# Patient Record
(demographics unavailable — no encounter records)

---

## 2024-10-09 NOTE — HISTORY OF PRESENT ILLNESS
[No falls in past year] : Patient reported no falls in the past year [0] : 2) Feeling down, depressed, or hopeless: Not at all (0) [PHQ-2 Negative - No further assessment needed] : PHQ-2 Negative - No further assessment needed [FreeTextEntry1] : PCP Dr. Rosenberg  Cardiology Dr. Rausch Uro Dr. WU  Mr. GIGI DONG is a 98 year old man with pmhx of  BPH, insomnia, htn,  a fib s/p cardioversion, anemia and fatigue, hx ot uti, LE edema presents to the office for a follow up visit.  He endorses minor cold for the past day. He denied fever, chills, bodyaches, shortness of breath. He is concerned about blood pressure being elevated at home. He brings a log. He denied any headaches or vision changes. He is taking losartan with lunch. Noticed bp elevated in the morning. Advised BID dosing of losartan 25mg , which he is amenable for.  [[id number]] : 0

## 2024-10-09 NOTE — REVIEW OF SYSTEMS
[Lower Ext Edema] : lower extremity edema [Fever] : no fever [Chills] : no chills [Heart Rate Is Slow] : the heart rate was not slow [Heart Rate Is Fast] : the heart rate was not fast [Chest Pain] : no chest pain [Palpitations] : no palpitations [Shortness Of Breath] : no shortness of breath [Wheezing] : no wheezing [Cough] : no cough [Abdominal Pain] : no abdominal pain [Vomiting] : no vomiting [Constipation] : no constipation [Diarrhea] : no diarrhea [Dysuria] : no dysuria [Confused] : no confusion [Dizziness] : no dizziness [Anxiety] : no anxiety [Depression] : no depression

## 2024-10-09 NOTE — PHYSICAL EXAM
[Alert] : alert [EOMI] : extraocular movements were intact [Normal Appearance] : the appearance of the neck was normal [Supple] : the neck was supple [No Respiratory Distress] : no respiratory distress [No Acc Muscle Use] : no accessory muscle use [Auscultation Breath Sounds / Voice Sounds] : lungs were clear to auscultation bilaterally [Normal S1, S2] : normal S1 and S2 [Heart Rate And Rhythm] : heart rate was normal and rhythm regular [___ +] : bilateral [unfilled]+ pretibial pitting edema [Abdomen Tenderness] : non-tender [Abdomen Soft] : soft [No Spinal Tenderness] : no spinal tenderness [Normal Color / Pigmentation] : normal skin color and pigmentation [Oriented To Time, Place, And Person] : oriented to person, place, and time [Normal Affect] : the affect was normal [Normal Mood] : the mood was normal

## 2024-10-19 NOTE — REVIEW OF SYSTEMS
[Lower Ext Edema] : lower extremity edema [Shortness Of Breath] : shortness of breath [Fever] : no fever [Chills] : no chills [Heart Rate Is Slow] : the heart rate was not slow [Heart Rate Is Fast] : the heart rate was not fast [Chest Pain] : no chest pain [Palpitations] : no palpitations [Wheezing] : no wheezing [Cough] : no cough [Abdominal Pain] : no abdominal pain [Vomiting] : no vomiting [Constipation] : no constipation [Diarrhea] : no diarrhea [Dysuria] : no dysuria [Confused] : no confusion [Dizziness] : no dizziness [Anxiety] : no anxiety [Depression] : no depression [FreeTextEntry6] : sob on exertion - chronic

## 2024-10-19 NOTE — PHYSICAL EXAM
[Alert] : alert [EOMI] : extraocular movements were intact [Normal Appearance] : the appearance of the neck was normal [Supple] : the neck was supple [No Respiratory Distress] : no respiratory distress [No Acc Muscle Use] : no accessory muscle use [Auscultation Breath Sounds / Voice Sounds] : lungs were clear to auscultation bilaterally [Normal S1, S2] : normal S1 and S2 [Heart Rate And Rhythm] : heart rate was normal and rhythm regular [___ +] : bilateral [unfilled]+ pretibial pitting edema [Abdomen Tenderness] : non-tender [Abdomen Soft] : soft [No Spinal Tenderness] : no spinal tenderness [Normal Color / Pigmentation] : normal skin color and pigmentation [Oriented To Time, Place, And Person] : oriented to person, place, and time [Normal Affect] : the affect was normal [Normal Mood] : the mood was normal [de-identified] : pitting edema bilateral lower extremities

## 2024-10-19 NOTE — GOALS
[Patient] : patient [Healthcare proxy document is in chart] : Healthcare proxy document is in chart [Full Code] : Full Code [Time Spent: ___ minutes] : I, personally, spent [unfilled] minutes on advance care planning services with the patient.  This time is separate and distinct from any other care management services provided on this date [FreeTextEntry1] : Wilmar Hartmann [FreeTextEntry2] : son [FreeTextEntry3] : [phone] [FreeTextEntry6] : Andrea Lovett [FreeTextEntry5] : son [FreeTextEntry4] : [phone] [FreeTextEntry7] : Spoke about his urination issues, recent sepsis, kidney mass, being weaker and having less reserve.  Educated that he is currently full treatment and full code and if he doesnt want that then we need to write orders.  Explained what DNR and DNI is and means... questions answered. Pt is full code and will consider in future. [FreeTextEntry8] : Wilmar Lovett [FreeTextEntry9] : son [de-identified] : [phone]

## 2024-10-19 NOTE — HISTORY OF PRESENT ILLNESS
[No falls in past year] : Patient reported no falls in the past year [0] : 2) Feeling down, depressed, or hopeless: Not at all (0) [PHQ-2 Negative - No further assessment needed] : PHQ-2 Negative - No further assessment needed [FreeTextEntry1] : PCP Dr. Rosenberg  Cardiology Dr. Rausch Uro Dr. [facility]  Mr. GIGI DONG is a 98 year old man with pmhx of  BPH, insomnia, htn,  a fib s/p cardioversion, anemia and fatigue, hx ot uti, LE edema presents to the office for a follow up visit.    Received calls over the past weekend that patient's home blood pressure measurements have been elevated. Patient's dose of losartan was increased from 25 BID to 50 BID, and still remained uncontrolled. Patient was then additionally started on amlodipine 5 mg oral QD.  Today the patient's blood pressure is; 151/62. Repeat with home blood pressure monitor: 148/61.  He denies chest pain, sob, diaphoresis, nausea and vomiting, headaches, and abd pain.  We also discussed patient renal mass; chronic concern. No follow ups needed at this time. The patient has seen radonc and urology in the past as well. He follows with urology also for urinary retention and has a richard bag. [[id number]] : 0

## 2024-10-19 NOTE — GOALS
[Patient] : patient [Healthcare proxy document is in chart] : Healthcare proxy document is in chart [Full Code] : Full Code [Time Spent: ___ minutes] : I, personally, spent [unfilled] minutes on advance care planning services with the patient.  This time is separate and distinct from any other care management services provided on this date [FreeTextEntry1] : Wilmar Hartmann [FreeTextEntry3] : [phone] [FreeTextEntry2] : son [FreeTextEntry5] : son [FreeTextEntry6] : Andrea Lovett [FreeTextEntry4] : [phone] [FreeTextEntry7] : Spoke about his urination issues, recent sepsis, kidney mass, being weaker and having less reserve.  Educated that he is currently full treatment and full code and if he doesnt want that then we need to write orders.  Explained what DNR and DNI is and means... questions answered. Pt is full code and will consider in future. [FreeTextEntry8] : Wilmar Lovett [FreeTextEntry9] : son [de-identified] : [phone]

## 2024-10-19 NOTE — PHYSICAL EXAM
[Alert] : alert [EOMI] : extraocular movements were intact [Normal Appearance] : the appearance of the neck was normal [Supple] : the neck was supple [No Respiratory Distress] : no respiratory distress [No Acc Muscle Use] : no accessory muscle use [Auscultation Breath Sounds / Voice Sounds] : lungs were clear to auscultation bilaterally [Normal S1, S2] : normal S1 and S2 [Heart Rate And Rhythm] : heart rate was normal and rhythm regular [___ +] : bilateral [unfilled]+ pretibial pitting edema [Abdomen Tenderness] : non-tender [Abdomen Soft] : soft [No Spinal Tenderness] : no spinal tenderness [Normal Color / Pigmentation] : normal skin color and pigmentation [Oriented To Time, Place, And Person] : oriented to person, place, and time [Normal Affect] : the affect was normal [Normal Mood] : the mood was normal [de-identified] : pitting edema bilateral lower extremities

## 2024-10-19 NOTE — END OF VISIT
[Time Spent: ___ minutes] : I have spent [unfilled] minutes of time on the encounter which excludes teaching and separately reported services. [] : Fellow [FreeTextEntry3] : pt stable, but becoming more complex and pt starting to appreciate that he is in a new state. Pt will not be going to [place] this winter for the first time.  ACP discussed. Pt full code... educated on DNR and DNI, but pt to think about it.

## 2024-10-19 NOTE — HISTORY OF PRESENT ILLNESS
[No falls in past year] : Patient reported no falls in the past year [0] : 2) Feeling down, depressed, or hopeless: Not at all (0) [PHQ-2 Negative - No further assessment needed] : PHQ-2 Negative - No further assessment needed [FreeTextEntry1] : PCP Dr. Rosenberg  Cardiology Dr. Rausch Uro Dr. [facility]U  Mr. GIGI DONG is a 98 year old man with pmhx of  BPH, insomnia, htn,  a fib s/p cardioversion, anemia and fatigue, hx ot uti, LE edema presents to the office for a follow up visit.    Received calls over the past weekend that patient's home blood pressure measurements have been elevated. Patient's dose of losartan was increased from 25 BID to 50 BID, and still remained uncontrolled. Patient was then additionally started on amlodipine 5 mg oral QD.  Today the patient's blood pressure is; 151/62. Repeat with home blood pressure monitor: 148/61.  He denies chest pain, sob, diaphoresis, nausea and vomiting, headaches, and abd pain.  We also discussed patient renal mass; chronic concern. No follow ups needed at this time. The patient has seen radonc and urology in the past as well. He follows with urology also for urinary retention and has a richard bag. [[id number]] : 0

## 2024-10-19 NOTE — PHYSICAL EXAM
[Alert] : alert [EOMI] : extraocular movements were intact [Normal Appearance] : the appearance of the neck was normal [Supple] : the neck was supple [No Respiratory Distress] : no respiratory distress [No Acc Muscle Use] : no accessory muscle use [Auscultation Breath Sounds / Voice Sounds] : lungs were clear to auscultation bilaterally [Normal S1, S2] : normal S1 and S2 [Heart Rate And Rhythm] : heart rate was normal and rhythm regular [___ +] : bilateral [unfilled]+ pretibial pitting edema [Abdomen Tenderness] : non-tender [Abdomen Soft] : soft [No Spinal Tenderness] : no spinal tenderness [Normal Color / Pigmentation] : normal skin color and pigmentation [Oriented To Time, Place, And Person] : oriented to person, place, and time [Normal Affect] : the affect was normal [Normal Mood] : the mood was normal [de-identified] : pitting edema bilateral lower extremities

## 2024-10-19 NOTE — HISTORY OF PRESENT ILLNESS
[No falls in past year] : Patient reported no falls in the past year [0] : 2) Feeling down, depressed, or hopeless: Not at all (0) [PHQ-2 Negative - No further assessment needed] : PHQ-2 Negative - No further assessment needed [FreeTextEntry1] : PCP Dr. Rosenberg  Cardiology Dr. Rausch Uro Dr. [facility]U  Mr. GIGI DONG is a 98 year old man with pmhx of  BPH, insomnia, htn,  a fib s/p cardioversion, anemia and fatigue, hx ot uti, LE edema presents to the office for a follow up visit.    Received calls over the past weekend that patient's home blood pressure measurements have been elevated. Patient's dose of losartan was increased from 25 BID to 50 BID, and still remained uncontrolled. Patient was then additionally started on amlodipine 5 mg oral QD.  Today the patient's blood pressure is; 151/62. Repeat with home blood pressure monitor: 148/61.  He denies chest pain, sob, diaphoresis, nausea and vomiting, headaches, and abd pain.  We also discussed patient renal mass; chronic concern. No follow ups needed at this time. The patient has seen radonc and urology in the past as well. He follows with urology also for urinary retention and has a richard bag. [[id number]] : 0

## 2024-10-19 NOTE — GOALS
[Patient] : patient [Healthcare proxy document is in chart] : Healthcare proxy document is in chart [Full Code] : Full Code [Time Spent: ___ minutes] : I, personally, spent [unfilled] minutes on advance care planning services with the patient.  This time is separate and distinct from any other care management services provided on this date [FreeTextEntry1] : Wilmar Hartmann [FreeTextEntry3] : [phone] [FreeTextEntry2] : son [FreeTextEntry6] : Andrea Lovett [FreeTextEntry5] : son [FreeTextEntry4] : [phone] [FreeTextEntry7] : Spoke about his urination issues, recent sepsis, kidney mass, being weaker and having less reserve.  Educated that he is currently full treatment and full code and if he doesnt want that then we need to write orders.  Explained what DNR and DNI is and means... questions answered. Pt is full code and will consider in future. [FreeTextEntry8] : Wilmar Lovett [FreeTextEntry9] : son [de-identified] : [phone]

## 2024-11-25 NOTE — DISCUSSION/SUMMARY
[FreeTextEntry1] : Mr. Allen is an 98 year-old gentleman with moderate aortic stenosis who underwent PCI of his LAD on 12/28/2016 for angina, after an abnormal stress test.  Newly diagnosed renal mass. Eval at [facility] underway. Planning MRI surveillance. ECG shows normal sinus rhythm with Normal axis and normal intervals. PAF: In SR now. Continue Eliquis 2.5 bid For stroke risk reduction. Aortic stenosis: Echocardiography done in the hospital showed normal left ventricular systolic function with moderate to severe AS. does not appear to be in the severe range on echocardiography or on examination today. Discussed the possibility of TAVR if dyspena is worse. Hypertension: Well controlled. On no medical therapy. Coronary artery disease: No angina. He should continue high-dose statin And baby aspirin. Anemia is significant, but stable over the past month. Plan to see Dr. Rosenberg for further workup. PRN viagra at the lowest dose possible.  Follow-up in March.  [EKG obtained to assist in diagnosis and management of assessed problem(s)] : EKG obtained to assist in diagnosis and management of assessed problem(s)

## 2024-11-25 NOTE — PHYSICAL EXAM
[General Appearance - Well Developed] : well developed [General Appearance - Well Nourished] : well nourished [General Appearance - In No Acute Distress] : no acute distress [Normal Conjunctiva] : the conjunctiva exhibited no abnormalities [No Oral Pallor] : no oral pallor [Normal Jugular Venous V Waves Present] : normal jugular venous V waves present [Respiration, Rhythm And Depth] : normal respiratory rhythm and effort [Auscultation Breath Sounds / Voice Sounds] : lungs were clear to auscultation bilaterally [Heart Sounds] : normal S1 and S2 [Arterial Pulses Normal] : the arterial pulses were normal [Regular] : the rhythm was regular [FreeTextEntry1] : there is a 1/6 systolic ejection murmur at the right upper sternal border, radiating to the carotids. His left leg is swollen to the knee with tenderness in the posterior fossa. [Bowel Sounds] : normal bowel sounds [Abdomen Soft] : soft [Abdomen Tenderness] : non-tender [Abnormal Walk] : normal gait [Cyanosis, Localized] : no localized cyanosis [Skin Turgor] : normal skin turgor [Oriented To Time, Place, And Person] : oriented to person, place, and time [Impaired Insight] : insight and judgment were intact [Affect] : the affect was normal
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
## 2024-12-26 NOTE — REVIEW OF SYSTEMS
[SOB on Exertion] : shortness of breath during exertion [Itching] : itching [Dry Skin] : dry skin [Negative] : Heme/Lymph
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
## 2024-12-27 NOTE — PHYSICAL EXAM
[Alert] : alert [No Acute Distress] : in no acute distress [Normal Outer Ear/Nose] : the ears and nose were normal in appearance [Normal Appearance] : the appearance of the neck was normal [No Respiratory Distress] : no respiratory distress [No Acc Muscle Use] : no accessory muscle use [Bowel Sounds] : normal bowel sounds [Abdomen Tenderness] : non-tender [Abdomen Soft] : soft [Normal Turgor] : normal skin turgor [No Focal Deficits] : no focal deficits [Normal Affect] : the affect was normal [Normal Mood] : the mood was normal [de-identified] : right lower leg mildly erythematous, no edema, skin very dry bilaterally
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
## 2024-12-27 NOTE — PHYSICAL EXAM
[Alert] : alert [No Acute Distress] : in no acute distress [Normal Outer Ear/Nose] : the ears and nose were normal in appearance [Normal Appearance] : the appearance of the neck was normal [No Respiratory Distress] : no respiratory distress [No Acc Muscle Use] : no accessory muscle use [Bowel Sounds] : normal bowel sounds [Abdomen Tenderness] : non-tender [Abdomen Soft] : soft [Normal Turgor] : normal skin turgor [No Focal Deficits] : no focal deficits [Normal Affect] : the affect was normal [Normal Mood] : the mood was normal [de-identified] : right lower leg mildly erythematous, no edema, skin very dry bilaterally

## 2025-02-28 NOTE — END OF VISIT
[] : Fellow [FreeTextEntry3] : Pt seen with fellow. Pt still with edema, no sob, improving. Mir in place. No changes.

## 2025-02-28 NOTE — REVIEW OF SYSTEMS
[Lower Ext Edema] : lower extremity edema [As Noted in HPI] : as noted in HPI [SOB on Exertion] : shortness of breath during exertion [Negative] : Heme/Lymph [Wheezing] : no wheezing [Cough] : no cough

## 2025-02-28 NOTE — PHYSICAL EXAM
[Alert] : alert [No Acute Distress] : in no acute distress [Sclera] : the sclera and conjunctiva were normal [EOMI] : extraocular movements were intact [Normal Outer Ear/Nose] : the ears and nose were normal in appearance [Normal Appearance] : the appearance of the neck was normal [No Respiratory Distress] : no respiratory distress [Auscultation Breath Sounds / Voice Sounds] : lungs were clear to auscultation bilaterally [Normal S1, S2] : normal S1 and S2 [Heart Rate And Rhythm] : heart rate was normal and rhythm regular [Pedal Pulses Normal] : the pedal pulses are present [Bowel Sounds] : normal bowel sounds [Abdomen Tenderness] : non-tender [Abdomen Soft] : soft [Normal Color / Pigmentation] : normal skin color and pigmentation [Normal Turgor] : normal skin turgor [No Focal Deficits] : no focal deficits [Normal Affect] : the affect was normal [Normal Mood] : the mood was normal [de-identified] : 1+ pitting edema bilat to mid shin [de-identified] : +richard with leg bag draining clear yellow urine

## 2025-02-28 NOTE — PHYSICAL EXAM
[Alert] : alert [No Acute Distress] : in no acute distress [Sclera] : the sclera and conjunctiva were normal [EOMI] : extraocular movements were intact [Normal Outer Ear/Nose] : the ears and nose were normal in appearance [Normal Appearance] : the appearance of the neck was normal [No Respiratory Distress] : no respiratory distress [Auscultation Breath Sounds / Voice Sounds] : lungs were clear to auscultation bilaterally [Normal S1, S2] : normal S1 and S2 [Heart Rate And Rhythm] : heart rate was normal and rhythm regular [Pedal Pulses Normal] : the pedal pulses are present [Bowel Sounds] : normal bowel sounds [Abdomen Tenderness] : non-tender [Abdomen Soft] : soft [Normal Color / Pigmentation] : normal skin color and pigmentation [Normal Turgor] : normal skin turgor [No Focal Deficits] : no focal deficits [Normal Affect] : the affect was normal [Normal Mood] : the mood was normal [de-identified] : 1+ pitting edema bilat to mid shin [de-identified] : +richard with leg bag draining clear yellow urine

## 2025-02-28 NOTE — HISTORY OF PRESENT ILLNESS
[No falls in past year] : Patient reported no falls in the past year [Completely Independent] : Completely independent. [0] : 2) Feeling down, depressed, or hopeless: Not at all (0) [PHQ-2 Negative - No further assessment needed] : PHQ-2 Negative - No further assessment needed [FreeTextEntry1] : Mr Allen is a 99-year-old M WW2  with history of AS, CAD with stenting, Aflutter (on eliquis), BPH c/b urinary retention with indwelling richard, RCC s/p partial nephrectomy, orthostatic hypotension presents for follow up for leg swelling secondary to mod-severe AS.  Was started on 20 mg furosemide daily approx 1-2 weeks ago, states minimal improvement in leg swelling and dyspnea on exertion. Unable to quantify if he has increased urination given he has a richard.   Otherwise shares concerns about his anemia, recently seen by Heme Onc and labs showed iron deficiency anemia. Reports that during a richard catheter change he had a significant amount of bleeding. Was told by hematology they will recheck labs in about a month given recent acute bleed to see if they should start iron therapy. Also was seen by ENT for nasal polyps but ENT hesitant to proceed with resection given pt is on ASA and eliquis.  Regarding BPH with chronic richard, pt states he changes the richard bag once a day and the catheter once monthly, was last change 3 weeks ago and is due next week.   States insomnia is well controlled with trazodone for many years.

## 2025-04-03 NOTE — PHYSICAL EXAM
[General Appearance - Well Developed] : well developed [General Appearance - Well Nourished] : well nourished [General Appearance - In No Acute Distress] : no acute distress [Normal Conjunctiva] : the conjunctiva exhibited no abnormalities [No Oral Pallor] : no oral pallor [Normal Jugular Venous V Waves Present] : normal jugular venous V waves present [Respiration, Rhythm And Depth] : normal respiratory rhythm and effort [Auscultation Breath Sounds / Voice Sounds] : lungs were clear to auscultation bilaterally [Heart Sounds] : normal S1 and S2 [Arterial Pulses Normal] : the arterial pulses were normal [Regular] : the rhythm was regular [Bowel Sounds] : normal bowel sounds [Abdomen Soft] : soft [Abdomen Tenderness] : non-tender [Abnormal Walk] : normal gait [Cyanosis, Localized] : no localized cyanosis [Skin Turgor] : normal skin turgor [Oriented To Time, Place, And Person] : oriented to person, place, and time [Impaired Insight] : insight and judgment were intact [Affect] : the affect was normal [FreeTextEntry1] : there is a 1/6 systolic ejection murmur at the right upper sternal border, radiating to the carotids. His left leg is swollen to the knee with tenderness in the posterior fossa.

## 2025-04-03 NOTE — DISCUSSION/SUMMARY
[FreeTextEntry1] : Mr. Allen is an 99 year-old gentleman with moderate to sever aortic stenosis who underwent PCI of his LAD on 12/28/2016 for angina.  Now with syncope and head trauma. Syncope: Consist possible etiologies include orthostatic syncope or arrhythmic syncope.  I am also concerned that his aortic stenosis has progressed which would make him unusually sensitive to orthostatic changes. Towards that end I have suggested that he reduce his Lasix dose to thrice weekly, Monday Wednesday Friday and have suggested that he be evaluated for potential TAVR procedure.  His current rhythm is sinus bradycardia and I have placed a cardiac monitor on him for 1 week to exclude any underlying electrical abnormalities that would explain his syncope.   PAF: In SR now. 1 episode of unexplained syncope.  We discussed the possible risks of rebleeding and given the fact that he is no longer orthostatic today on exam I have suggested restarting Eliquis 2.5 mg twice daily for stroke risk reduction.  Aortic stenosis: TAVR eval. Hypertension: Reduced Lasix dose, will consider reducing his amlodipine or losartan if blood pressure remains low. Coronary artery disease: No angina. He should continue high-dose statin We have discontinued his aspirin therapy As a poses too high risk for him at this time. He will continue Eliquis 2.5 twice daily. Written instructions were given to him. Follow-up with me in 1 month.  [EKG obtained to assist in diagnosis and management of assessed problem(s)] : EKG obtained to assist in diagnosis and management of assessed problem(s)
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
## 2025-04-08 NOTE — RESULTS/DATA
[FreeTextEntry1] : EXAM: [id number] - CT BRAIN  - ORDERED BY: VIVIAN TRONCOSO   PROCEDURE DATE:  03/28/2025    INTERPRETATION:  .  CLINICAL INFORMATION: Traumatic brain injury with subarachnoid hemorrhage. Follow-up.  TECHNIQUE: Multiple axial CT images of the head were obtained without contrast. Sagittal and coronal reconstructed images were acquired from the source data.  COMPARISON: Prior CT examination of the head from 3/13/2025.  FINDINGS: Previously seen right frontal acute subarachnoid hemorrhage has resolved. No new areas of acute intracranial hemorrhage are seen.  There is no acute intracranial hemorrhage, mass effect, shift of the midline structures, herniation, extra-axial fluid collection, or hydrocephalus.  There is mild diffuse cerebral volume loss with prominence of the sulci, fissures, and cisternal spaces which is normal for the patient's age. There is mild deep and periventricular white matter hypoattenuation statistically compatible with microvascular changes given calcific atherosclerotic disease of the intracranial arteries.  Polypoidal soft tissue within the bilateral sinonasal cavities appear unchanged. Bilateral mastoid effusions appear unchanged. The calvarium is intact. The imaged orbits are unremarkable. Right frontal scalp soft tissue swelling extending to the periorbital region has decreased.  IMPRESSION: Interval resolution of right frontal subarachnoid hemorrhage in comparison with 3/13/2025.  No new areas of acute intracranial hemorrhage.  Interval decrease in the amount of right frontal scalp soft tissue swelling extending to the periorbital region.

## 2025-04-08 NOTE — RESULTS/DATA
[FreeTextEntry1] : EXAM: [id number] - CT BRAIN  - ORDERED BY: VIVIAN TRONCOSO   PROCEDURE DATE:  03/28/2025    INTERPRETATION:  .  CLINICAL INFORMATION: Traumatic brain injury with subarachnoid hemorrhage. Follow-up.  TECHNIQUE: Multiple axial CT images of the head were obtained without contrast. Sagittal and coronal reconstructed images were acquired from the source data.  COMPARISON: Prior CT examination of the head from 3/13/2025.  FINDINGS: Previously seen right frontal acute subarachnoid hemorrhage has resolved. No new areas of acute intracranial hemorrhage are seen.  There is no acute intracranial hemorrhage, mass effect, shift of the midline structures, herniation, extra-axial fluid collection, or hydrocephalus.  There is mild diffuse cerebral volume loss with prominence of the sulci, fissures, and cisternal spaces which is normal for the patient's age. There is mild deep and periventricular white matter hypoattenuation statistically compatible with microvascular changes given calcific atherosclerotic disease of the intracranial arteries.  Polypoidal soft tissue within the bilateral sinonasal cavities appear unchanged. Bilateral mastoid effusions appear unchanged. The calvarium is intact. The imaged orbits are unremarkable. Right frontal scalp soft tissue swelling extending to the periorbital region has decreased.  IMPRESSION: Interval resolution of right frontal subarachnoid hemorrhage in comparison with 3/13/2025.  No new areas of acute intracranial hemorrhage.  Interval decrease in the amount of right frontal scalp soft tissue swelling extending to the periorbital region.

## 2025-04-08 NOTE — HISTORY OF PRESENT ILLNESS
[FreeTextEntry1] : Bony Allen is a 99 is here for follow up after a fall. Pt stated that he was doing well, but he was shopping and while back to car responding to a call, while turning he fell on 3/11/2025, with LOC. He said he was on Eliquis/ASA for Afib /stents (10 years). prior to fall.  CTH w/ R frontal SAH and R periorbital ecchymosis.   Today Mr. Allen present ambulatory reporting that he is feeling well.  Denies headache, speech impairment, vision problems or seizures. He reports that he gets some dizziness at times. Pt also reports sleeping issues and had taken Tab Trazadone yesterday.

## 2025-04-08 NOTE — PHYSICAL EXAM
[General Appearance - Alert] : alert [General Appearance - In No Acute Distress] : in no acute distress [General Appearance - Well Nourished] : well nourished [General Appearance - Well-Appearing] : healthy appearing [Oriented To Time, Place, And Person] : oriented to person, place, and time [Impaired Insight] : insight and judgment were intact [Affect] : the affect was normal [Memory Recent] : recent memory was not impaired [Person] : oriented to person [Place] : oriented to place [Time] : oriented to time [Short Term Intact] : short term memory intact [Cranial Nerves Optic (II)] : visual acuity intact bilaterally,  pupils equal round and reactive to light [Cranial Nerves Oculomotor (III)] : extraocular motion intact [Cranial Nerves Trigeminal (V)] : facial sensation intact symmetrically [Cranial Nerves Facial (VII)] : face symmetrical [Cranial Nerves Vestibulocochlear (VIII)] : hearing was intact bilaterally [Cranial Nerves Accessory (XI - Cranial And Spinal)] : head turning and shoulder shrug symmetric [Cranial Nerves Hypoglossal (XII)] : there was no tongue deviation with protrusion [PERRL With Normal Accommodation] : pupils were equal in size, round, reactive to light, with normal accommodation [Full Visual Field] : full visual field [Outer Ear] : the ears and nose were normal in appearance [] : no respiratory distress [Heart Rate And Rhythm] : heart rate was normal and rhythm regular [No Spinal Tenderness] : no spinal tenderness [Involuntary Movements] : no involuntary movements were seen [FreeTextEntry8] : no drift, mild imbalance at tandem

## 2025-04-08 NOTE — ASSESSMENT
[FreeTextEntry1] : Bony Tucker is a 99 is here for follow up after a fall. Pt stated that he was doing well, but he was shopping and while turning he fell, does not remember. He was on Eliquis/ASA  for Afib/stents (10 years). prior to fall.  CTH w/ R frontal SAH and R perioribital echymosis.  Today Mr. Allen doing well with not many symptoms, other than occasional dizziness.  CT head on 3/28/25 with resolution of SDH.  PLAN:  May go back to AC/AP as needed directed by cardiologist Discuss with cardiologist regarding risk/benefit AC/AP  To watch Dizziness, suggested PT for dizziness, but pt defer at this time F/U in 1 month

## 2025-04-08 NOTE — RESULTS/DATA
[FreeTextEntry1] : EXAM: [id number] - CT BRAIN  - ORDERED BY: VIVIAN TRONCOSO   PROCEDURE DATE:  03/28/2025    INTERPRETATION:  .  CLINICAL INFORMATION: Traumatic brain injury with subarachnoid hemorrhage. Follow-up.  TECHNIQUE: Multiple axial CT images of the head were obtained without contrast. Sagittal and coronal reconstructed images were acquired from the source data.  COMPARISON: Prior CT examination of the head from 3/13/2025.  FINDINGS: Previously seen right frontal acute subarachnoid hemorrhage has resolved. No new areas of acute intracranial hemorrhage are seen.  There is no acute intracranial hemorrhage, mass effect, shift of the midline structures, herniation, extra-axial fluid collection, or hydrocephalus.  There is mild diffuse cerebral volume loss with prominence of the sulci, fissures, and cisternal spaces which is normal for the patient's age. There is mild deep and periventricular white matter hypoattenuation statistically compatible with microvascular changes given calcific atherosclerotic disease of the intracranial arteries.  Polypoidal soft tissue within the bilateral sinonasal cavities appear unchanged. Bilateral mastoid effusions appear unchanged. The calvarium is intact. The imaged orbits are unremarkable. Right frontal scalp soft tissue swelling extending to the periorbital region has decreased.  IMPRESSION: Interval resolution of right frontal subarachnoid hemorrhage in comparison with 3/13/2025.  No new areas of acute intracranial hemorrhage.  Interval decrease in the amount of right frontal scalp soft tissue swelling extending to the periorbital region.

## 2025-04-15 NOTE — HISTORY OF PRESENT ILLNESS
[FreeTextEntry1] : Fall - [facility] with SDH Change of meds by Dr lisker F/U with Neurosurgery Renal onc - no treatment Cramps.

## 2025-04-16 NOTE — GOALS
[Patient] : patient [DNR/DNI] : DNR/DNI [MOLST Completed] : MOLST Completed [Time Spent: ___ minutes] : I, personally, spent [unfilled] minutes on advance care planning services with the patient.  This time is separate and distinct from any other care management services provided on this date [FreeTextEntry1] : Andrea  [FreeTextEntry2] : son [FreeTextEntry3] : [phone] [FreeTextEntry6] : Alireza [FreeTextEntry5] : son [FreeTextEntry4] : [phone] [FreeTextEntry7] : Met with Pt to discuss pts status and possiblity of next phases. Pt with fall with significant injury, left renal mass. Goals of care discussion held and MOLST orders reviewed. Twnenty min discussion held. Pt is DNR/DNI.MOLST form in chart. [FreeTextEntry8] : Alireza Almonte [FreeTextEntry9] : sons

## 2025-04-16 NOTE — GOALS
[Patient] : patient [DNR/DNI] : DNR/DNI [MOLST Completed] : MOLST Completed [Time Spent: ___ minutes] : I, personally, spent [unfilled] minutes on advance care planning services with the patient.  This time is separate and distinct from any other care management services provided on this date [FreeTextEntry1] : Andrea  [FreeTextEntry2] : son [FreeTextEntry3] : [phone] [FreeTextEntry6] : Alireza [FreeTextEntry5] : son [FreeTextEntry4] : [phone] [FreeTextEntry7] : Met with Pt to discuss pts status and possiblity of next phases. Pt with fall with significant injury, left renal mass. Goals of care discussion held and MOLST orders reviewed. Twnenty min discussion held. Pt is DNR/DNI.MOLST form in chart. [FreeTextEntry8] : Alireza Almonte [FreeTextEntry9] : sons

## 2025-04-16 NOTE — HISTORY OF PRESENT ILLNESS
[Any fall with injury in past year] : Patient reported fall with injury in the past year [Completely Independent] : Completely independent. [Designated Healthcare Proxy] : Designated healthcare proxy [Name: ___] : Health Care Proxy's Name: [unfilled]  [Relationship: ___] : Relationship: [unfilled] [I will adhere to the patient's wishes.] : I will adhere to the patient's wishes. [Time Spent: ___ minutes] : Time Spent: [unfilled] minutes [FreeTextEntry1] : 98 yo male who was recently at ED in [facility] following a Fall where they identified a hematoma and black eye.  He describes the fall as occurring in his garage, tripping over a floor edge. He saw Dr. Lisker recently who decreased his lasix and stopped his ASA. He had a F/U with Neurosurgery, no further followup needed.  He has a left renal mass which has not been biopsied and wont be and it is being watched, no treatment. Pt does complain of leg cramps.  In light of recent serious fall, renal mass... goals of care discussion held with pt.   [FreeTextEntry4] : Met with pt to discuss pts status and possiblity of next phases. Pt with fallwith injury, has left renal mass. Goals of care discussion held and MOLST orders reviewed. 20min discussion held. Pt is DNR/DNI.MOLST form in chart.

## 2025-04-16 NOTE — PHYSICAL EXAM
[Alert] : alert [No Acute Distress] : in no acute distress [Normal Outer Ear/Nose] : the ears and nose were normal in appearance [Normal Appearance] : the appearance of the neck was normal [Supple] : the neck was supple [No Respiratory Distress] : no respiratory distress [No Acc Muscle Use] : no accessory muscle use [Respiration, Rhythm And Depth] : normal respiratory rhythm and effort [Auscultation Breath Sounds / Voice Sounds] : lungs were clear to auscultation bilaterally [Heart Rate And Rhythm] : heart rate was normal and rhythm regular [Bowel Sounds] : normal bowel sounds [Abdomen Tenderness] : non-tender [Abdomen Soft] : soft [No Spinal Tenderness] : no spinal tenderness [Normal Gait] : normal gait [Normal Color / Pigmentation] : normal skin color and pigmentation [Normal Turgor] : normal skin turgor [No Focal Deficits] : no focal deficits [Normal Affect] : the affect was normal [Normal Mood] : the mood was normal [de-identified] : edema mild

## 2025-04-16 NOTE — HISTORY OF PRESENT ILLNESS
[Any fall with injury in past year] : Patient reported fall with injury in the past year [Completely Independent] : Completely independent. [Designated Healthcare Proxy] : Designated healthcare proxy [Name: ___] : Health Care Proxy's Name: [unfilled]  [Relationship: ___] : Relationship: [unfilled] [I will adhere to the patient's wishes.] : I will adhere to the patient's wishes. [Time Spent: ___ minutes] : Time Spent: [unfilled] minutes [FreeTextEntry1] : 98 yo male who was recently at ED in [facility] following a Fall where they identified a hematoma and black eye.  He describes the fall as occurring in his garage, tripping over a floor edge. He saw Dr. Lisker recently who decreased his lasix and stopped his ASA. He had a F/U with Neurosurgery, no further followup needed.  He has a left renal mass which has not been biopsied and wont be and it is being watched, no treatment. Pt does complain of leg cramps.  In light of recent serious fall, renal mass... goals of care discussion held with pt.   [FreeTextEntry4] : Met with pt to discuss pts status and possiblity of next phases. Pt with fallwith injury, has left renal mass. Goals of care discussion held and MOLST orders reviewed. 20min discussion held. Pt is DNR/DNI.MOLST form in chart.

## 2025-04-16 NOTE — PHYSICAL EXAM
[Alert] : alert [No Acute Distress] : in no acute distress [Normal Outer Ear/Nose] : the ears and nose were normal in appearance [Normal Appearance] : the appearance of the neck was normal [Supple] : the neck was supple [No Respiratory Distress] : no respiratory distress [No Acc Muscle Use] : no accessory muscle use [Respiration, Rhythm And Depth] : normal respiratory rhythm and effort [Auscultation Breath Sounds / Voice Sounds] : lungs were clear to auscultation bilaterally [Heart Rate And Rhythm] : heart rate was normal and rhythm regular [Bowel Sounds] : normal bowel sounds [Abdomen Tenderness] : non-tender [Abdomen Soft] : soft [No Spinal Tenderness] : no spinal tenderness [Normal Gait] : normal gait [Normal Color / Pigmentation] : normal skin color and pigmentation [Normal Turgor] : normal skin turgor [No Focal Deficits] : no focal deficits [Normal Affect] : the affect was normal [Normal Mood] : the mood was normal [de-identified] : edema mild

## 2025-05-05 NOTE — ASSESSMENT
[FreeTextEntry1] : Bony Allen is a 99 is here for follow up after a fall. Pt stated that he was doing well, but he was shopping and while turning he fell, does not remember. He was on Eliquis/ASA for Afib/stents (10 years). prior to fall. CTH w/ R frontal SAH and R perioribital echymosis. Today Mr. Allen doing well but had an episode of LE pain and weakness one time, but resolved quickly.  CT abdomen 2024 suggests multilevel lumbar spondylosis and scoliosis.  As this was a one time event, will not pursue work up at this time.  Cardiologist removed ASA 81 mg.  His BP today likely elevated due to doctor's office as has a diary of mostly normal BP except for brief SBP 90's  PLAN: Pt now off Asa per cardiologist Pt is doing relatively well.  He has no essential need for neurosurgical intervention at this time Return as needed, rsoa if leg pain incident repeats.

## 2025-05-05 NOTE — HISTORY OF PRESENT ILLNESS
[FreeTextEntry1] : Bony Allen is a 99 is here for follow up after a fall. Pt stated that he was doing well, but he was shopping and while turning he fell, does not remember. He was on Eliquis/ASA for Afib/stents (10 years). prior to fall. CTH w/ R frontal SAH and R perioribital echymosis. Today Mr. Allen doing well with not many symptoms, other than occasional dizziness. CT head on 3/28/25 with resolution of SDH. Pt report having dizziness and was advised for PT/ watch and wait.   Today, pt states that his head bump is almost gone.  When he was about to eneter a doctor's building and his legs cramped up on him, only lsated about 15-20 seconds, but was painful.  It was an unusual occurance.  It ihas not happened again.  The other thing is that when he goes to bed, he feels a buring when putting pressure on the right lateral foot.  Yesterday, he felt the point where the pain is and noted a bump and wants us to take a look at it.  Blood pressure is erratic at times.  It could be caused by his medicines, from his experiences.  Otherwise he is ok.  He did well with the PT and sometimes he gets the light-headedness but went away after he released some tight fitting patns.  Amiodarone 200 mg qd Eliquis 2.5 mg bid Finasteride 5 mg qd Amlodipine 5 mg qd [id number] mg tiw FeSO4 325 mg atrovastatin 40 mg qd Losartan 25 mg qd Tamsulosin 0.4 mg Qd Trazodone 100 mg prn sleep.

## 2025-05-05 NOTE — PHYSICAL EXAM
[General Appearance - Alert] : alert [General Appearance - In No Acute Distress] : in no acute distress [Oriented To Time, Place, And Person] : oriented to person, place, and time [Impaired Insight] : insight and judgment were intact [Cranial Nerves Oculomotor (III)] : extraocular motion intact [Motor Tone] : muscle tone was normal in all four extremities [Motor Strength] : muscle strength was normal in all four extremities [FreeTextEntry5] : EOM intact, mild nystagmus on left lateral gaze, sustained, mild unsustained nystagmus right lateral gaze [FreeTextEntry6] : 5/5 motor, no drift, good  [FreeTextEntry8] : gait is alternating and independent without assistive device. [No Tenderness to Palpation] : no spine tenderness on palpation [FreeTextEntry1] : scab on right lateral mallelous, likely related to previous trauma, mild tendernss

## 2025-05-05 NOTE — HISTORY OF PRESENT ILLNESS
[FreeTextEntry1] : Bony Allen is a 99 is here for follow up after a fall. Pt stated that he was doing well, but he was shopping and while turning he fell, does not remember. He was on Eliquis/ASA for Afib/stents (10 years). prior to fall. CTH w/ R frontal SAH and R perioribital echymosis. Today Mr. Allen doing well with not many symptoms, other than occasional dizziness. CT head on 3/28/25 with resolution of SDH. Pt report having dizziness and was advised for PT/ watch and wait.   Today, pt states that his head bump is almost gone.  When he was about to eneter a doctor's building and his legs cramped up on him, only lsated about 15-20 seconds, but was painful.  It was an unusual occurance.  It ihas not happened again.  The other thing is that when he goes to bed, he feels a buring when putting pressure on the right lateral foot.  Yesterday, he felt the point where the pain is and noted a bump and wants us to take a look at it.  Blood pressure is erratic at times.  It could be caused by his medicines, from his experiences.  Otherwise he is ok.  He did well with the PT and sometimes he gets the light-headedness but went away after he released some tight fitting patns.  Amiodarone 200 mg qd Eliquis 2.5 mg bid Finasteride 5 mg qd Amlodipine 5 mg qd [id number] mg tiw FeSO4 325 mg atrovastatin 40 mg qd Losartan 25 mg qd Tamsulosin 0.4 mg Qd Trazodone 100 mg prn sleep.

## 2025-05-05 NOTE — ASSESSMENT
[FreeTextEntry1] : Bony Allen is a 99 is here for follow up after a fall. Pt stated that he was doing well, but he was shopping and while turning he fell, does not remember. He was on Eliquis/ASA for Afib/stents (10 years). prior to fall. CTH w/ R frontal SAH and R perioribital echymosis. Today Mr. Allen doing well but had an episode of LE pain and weakness one time, but resolved quickly.  CT abdomen 2024 suggests multilevel lumbar spondylosis and scoliosis.  As this was a one time event, will not pursue work up at this time.  Cardiologist removed ASA 81 mg.  His BP today likely elevated due to doctor's office as has a diary of mostly normal BP except for brief SBP 90's  PLAN: Pt now off Asa per cardiologist Pt is doing relatively well.  He has no essential need for neurosurgical intervention at this time Return as needed, rosa if leg pain incident repeats.

## 2025-05-15 NOTE — PHYSICAL EXAM
[Alert] : alert [Normal Outer Ear/Nose] : the ears and nose were normal in appearance [Normal Appearance] : the appearance of the neck was normal [Supple] : the neck was supple [No Respiratory Distress] : no respiratory distress [No Acc Muscle Use] : no accessory muscle use [Respiration, Rhythm And Depth] : normal respiratory rhythm and effort [Auscultation Breath Sounds / Voice Sounds] : lungs were clear to auscultation bilaterally [Heart Rate And Rhythm] : heart rate was normal and rhythm regular [Bowel Sounds] : normal bowel sounds [Abdomen Tenderness] : non-tender [Abdomen Soft] : soft [No Spinal Tenderness] : no spinal tenderness [Normal Gait] : normal gait [Normal Color / Pigmentation] : normal skin color and pigmentation [Normal Turgor] : normal skin turgor [No Focal Deficits] : no focal deficits [Normal Affect] : the affect was normal [Normal Mood] : the mood was normal [de-identified] : edema mild

## 2025-05-15 NOTE — HISTORY OF PRESENT ILLNESS
[Any fall with injury in past year] : Patient reported fall with injury in the past year [Completely Independent] : Completely independent. [Designated Healthcare Proxy] : Designated healthcare proxy [Name: ___] : Health Care Proxy's Name: [unfilled]  [Relationship: ___] : Relationship: [unfilled] [I will adhere to the patient's wishes.] : I will adhere to the patient's wishes. [Time Spent: ___ minutes] : Time Spent: [unfilled] minutes [FreeTextEntry1] : Care team:  PCP Dr. Rosenberg Cardiology Dr. Rausch Uro Dr. WU  Mr. GIGI DONG is a 98 year old man with pmhx of BPH, insomnia, htn, a fib s/p cardioversion, anemia and fatigue, hx ot uti, LE edema, hx of fall with hematoma who presents for a follow up.   He complains he noticed yesterday dark urine. He has had weight loss. He endorses his fluid intake is usually 3 cups of tea. He says today is better, but still dark. He has not changed his diet, or added any new medications. He denied fever, chills, diarrhea, abdominal pain, back pain, or any complaints.   He has hx of ckd and anemia, hematologist had placed labs on hold for may.   He is amenable for blood work today.   [0] : 2) Feeling down, depressed, or hopeless: Not at all (0) [PHQ-2 Negative - No further assessment needed] : PHQ-2 Negative - No further assessment needed [[id number]] : 0 [FreeTextEntry4] : Met with pt to discuss pts status and possiblity of next phases. Pt with fallwith injury, has left renal mass. Goals of care discussion held and MOLST orders reviewed. 20min discussion held. Pt is DNR/DNI.MOLST form in chart.

## 2025-05-15 NOTE — HISTORY OF PRESENT ILLNESS
[Any fall with injury in past year] : Patient reported fall with injury in the past year [Completely Independent] : Completely independent. [Designated Healthcare Proxy] : Designated healthcare proxy [Name: ___] : Health Care Proxy's Name: [unfilled]  [Relationship: ___] : Relationship: [unfilled] [I will adhere to the patient's wishes.] : I will adhere to the patient's wishes. [Time Spent: ___ minutes] : Time Spent: [unfilled] minutes [FreeTextEntry1] : Care team:  PCP Dr. Rosenberg Cardiology Dr. Rausch Uro Dr. WU  Mr. GIGI DONG is a 98 year old man with pmhx of BPH, insomnia, htn, a fib s/p cardioversion, anemia and fatigue, hx ot uti, LE edema, hx of fall with hematoma who presents for a follow up.   He complains he noticed yesterday dark urine. He has had weight loss. He endorses his fluid intake is usually 3 cups of tea. He says today is better, but still dark. He has not changed his diet, or added any new medications. He denied fever, chills, diarrhea, abdominal pain, back pain, or any complaints.   He has hx of ckd and anemia, hematologist had placed labs on hold for may.   He is amenable for blood work today.   [0] : 2) Feeling down, depressed, or hopeless: Not at all (0) [PHQ-2 Negative - No further assessment needed] : PHQ-2 Negative - No further assessment needed [[id number]] : 0 [FreeTextEntry4] : Met with pt to discuss pts status and possiblity of next phases. Pt with fallwith injury, has left renal mass. Goals of care discussion held and MOLST orders reviewed. 20min discussion held. Pt is DNR/DNI.MOLST form in chart.

## 2025-05-15 NOTE — PHYSICAL EXAM
[Alert] : alert [Normal Outer Ear/Nose] : the ears and nose were normal in appearance [Normal Appearance] : the appearance of the neck was normal [Supple] : the neck was supple [No Respiratory Distress] : no respiratory distress [No Acc Muscle Use] : no accessory muscle use [Respiration, Rhythm And Depth] : normal respiratory rhythm and effort [Auscultation Breath Sounds / Voice Sounds] : lungs were clear to auscultation bilaterally [Heart Rate And Rhythm] : heart rate was normal and rhythm regular [Bowel Sounds] : normal bowel sounds [Abdomen Tenderness] : non-tender [Abdomen Soft] : soft [No Spinal Tenderness] : no spinal tenderness [Normal Gait] : normal gait [Normal Color / Pigmentation] : normal skin color and pigmentation [Normal Turgor] : normal skin turgor [No Focal Deficits] : no focal deficits [Normal Affect] : the affect was normal [Normal Mood] : the mood was normal [de-identified] : edema mild
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
## 2025-05-19 NOTE — DISCUSSION/SUMMARY
[EKG obtained to assist in diagnosis and management of assessed problem(s)] : EKG obtained to assist in diagnosis and management of assessed problem(s) [FreeTextEntry1] : Mr. Allen is an 99 year-old gentleman with moderate to severe aortic stenosis who underwent PCI of his LAD on 12/28/2016 for angina.  Now s/p syncope of unclear etiology and head trauma.   Syncope: None since the last episode. Possible etiologies include orthostatic syncope, critical AS or arrhythmic syncope. No arrhythmias seen on cardiac monitor. AS not severe on exam. Plan to see Structural heart to see if AVR would make sense. Continue reduced lasix dose of thrice weekly, Monday Wednesday Friday.  PAF: In SR now. 1 episode of unexplained syncope. Continue Eliquis 2.5 bid for now.   Aortic stenosis: Not severe on exam, but given recent syncope, TAVR eval. Hypertension: well controlled on current regimen:  Continue amlodipine and losartan for now. Coronary artery disease: No angina. He should continue high-dose statin    Follow-up with me after seeing TAVR team.

## 2025-05-23 NOTE — REASON FOR VISIT
[Structural Heart and Valve Disease] : structural heart and valve disease [FreeTextEntry3] : Dr. Lisker

## 2025-05-23 NOTE — HISTORY OF PRESENT ILLNESS
[FreeTextEntry1] : Mr. Allen is a 99 year old male who is being referred to our clinic today by Dr. Lisker for evaluation of his Aortic Stenosis. He has a past medical history of paroxysmal AFib for which he is on Eliquis, CAD, BPH, Breast Mass, Prior C.difficile, Hyperlipdemia, recent fall with subsequent subarachnoid hemorrhage, and hypertension. He is a former smoker.  Today he presents  He had a Transthoracic Echocardiogram today which was evaluated with Dr. Binta Lopez, and demonstrates     NYHA Classification: STS 30-day Mortality Risk Score: 9.6%

## 2025-06-12 NOTE — PROCEDURE
[FreeTextEntry3] : After informed verbal consent is obtained, cerumen is removed from the b/l ear canal with a curette & suction. Pt tolerated well, no residual ear canal irritation.

## 2025-06-12 NOTE — CONSULT LETTER
[Dear  ___] : Dear  [unfilled], [Consult Letter:] : I had the pleasure of evaluating your patient, [unfilled]. [Consult Closing:] : Thank you very much for allowing me to participate in the care of this patient.  If you have any questions, please do not hesitate to contact me. [Please see my note below.] : Please see my note below. [FreeTextEntry3] : Ning Storey MD Otolaryngology- Facial Plastics  [address] [place] [id number] (P) - [phone] (F) - [phone]

## 2025-06-12 NOTE — HISTORY OF PRESENT ILLNESS
[de-identified] : Mr. DONG is a 99 year male here with worsening hearing loss L side longstanding.  [facility] which dont work well for him but now is using amplifiers which work well.  Suddnely, yesterday L ear hearing improved and R ear hearing worsened. he is very surprised with the change which occurred so quickly prior cerumen removal a few months ago he still feels his amplifier works

## 2025-06-12 NOTE — ASSESSMENT
[FreeTextEntry1] : SNHL:  - pt left prior to receiving his audio - although he described a sudden change, given that it was a sudden improvement in one ear and a sudden decrease in another and given the significant morbidity that would come with either high dose steroids or intratympanic steroids at 99 years old and also considering that he can hear from both ears with his amplifier I think no intervention is recommended at this time - f/up with us prn

## 2025-06-12 NOTE — END OF VISIT
[FreeTextEntry3] : I personally saw and examined GIGI DONG in detail.  I spoke to ROSS Pugh regarding the assessment and plan of care. I performed the procedures and relevant physical exam.   I have made changes to the body of the note wherever necessary and appropriate.

## 2025-07-09 NOTE — PHYSICAL EXAM
[Alert] : alert [Oriented x 3] : ~L oriented x 3 [Well Nourished] : well nourished [Conjunctiva Non-injected] : conjunctiva non-injected [No Visual Lymphadenopathy] : no visual  lymphadenopathy [No Clubbing] : no clubbing [No Edema] : no edema [No Bromhidrosis] : no bromhidrosis [No Chromhidrosis] : no chromhidrosis [FreeTextEntry3] : white skin right lower cheek pink and yellow crusted plaque purpuric patches forearms

## 2025-07-09 NOTE — HISTORY OF PRESENT ILLNESS
[FreeTextEntry1] : wart removal  [de-identified] : NPA - last seen for CBE in 2018 [place]  Hx BCC right cheek, remote has spot right cheek - [facility], did not get results, froze it, came back worse on eliquis hearing impaired

## 2025-07-21 NOTE — ASSESSMENT
[FreeTextEntry1] : Exetertional dyspnea, ground glass opacity of the lungs -, reviewed past ct scan of the chest, emphysematous changes in the left base. renewed fluticasone inhaler for patient. advised seeing pulmoloigist for breathing test.   skin cancer-  Hx BCC right cheek, remote.has spot right cheek - biopsied by doctor in [facility], did not get results, froze it, came back worse. saw [facility] dermatology, had shave biopsy, referred for microsurgery. dermatologist dr clemons information provided.   LE edema - improved significantly. follow up with dr wade.   hearing changes - audiology referral provided at patient request.    penile pain - resolved. follow up with urologist. has richard catheter, consider lidocaine if reocurrs.   htn - stable. no orthostatic vital signs appreciated today.   HM -Continue healthy meals and  physical activity as tolerated for weight maintenance. Vaccine information in chart. Orders and referral provided as below. Patient is here for episodic care. All patient questions answered today and understood by patient.  Henceforth, Patient to schedule in office follow up appointments including via Telehealth to discuss results and update plan of care, and if new symptoms, questions, renewals or health concerns. Patient has access to results and EMR in the patient portal for review.

## 2025-07-21 NOTE — HISTORY OF PRESENT ILLNESS
[Any fall with injury in past year] : Patient reported fall with injury in the past year [Completely Independent] : Completely independent. [0] : 2) Feeling down, depressed, or hopeless: Not at all (0) [PHQ-2 Negative - No further assessment needed] : PHQ-2 Negative - No further assessment needed [Designated Healthcare Proxy] : Designated healthcare proxy [Name: ___] : Health Care Proxy's Name: [unfilled]  [Relationship: ___] : Relationship: [unfilled] [I will adhere to the patient's wishes.] : I will adhere to the patient's wishes. [Time Spent: ___ minutes] : Time Spent: [unfilled] minutes [FreeTextEntry1] : Care team:  PCP Dr. Rosenberg Cardiology Dr. Rausch Uro Dr. WU  Mr. GIGI DONG is a 99  year old man with pmhx of BPH, insomnia, htn, a fib s/p cardioversion, anemia and fatigue, hx ot uti, LE edema, hx of fall with hematoma, 3cm renal mass, who presents for a follow up.   [facility] recommended follow up today. As per notes, He went to urgent care and they prescribed a 7 day course of cefpodoxime that did not resolve the pain.  He states on sunday his penile pain went away. He has been off antibiotics and has clear urine.   He complains of shortness of breath with exertion. He request renewal of fluticasone inhaler. [facility] Reviewed past ct scan of the lungs, ground glass opacities and and emphysematous changes of the left lower base appreciated.   He has weakness, orthostatics are fine.   Saw dermatologist. Hx BCC right cheek, remote.has spot right cheek - biopsied by doctor in [facility], did not get results, froze it, came back worse. saw [facility] dermatology, had shave biopsy, referred for microsurgery.  [[id number]] : 0 [FreeTextEntry4] : Pt is DNR/DNI.MOLST form in chart.

## 2025-07-21 NOTE — PHYSICAL EXAM
[Alert] : alert [Normal Outer Ear/Nose] : the ears and nose were normal in appearance [Normal Appearance] : the appearance of the neck was normal [Supple] : the neck was supple [No Respiratory Distress] : no respiratory distress [No Acc Muscle Use] : no accessory muscle use [Respiration, Rhythm And Depth] : normal respiratory rhythm and effort [Auscultation Breath Sounds / Voice Sounds] : lungs were clear to auscultation bilaterally [Heart Rate And Rhythm] : heart rate was normal and rhythm regular [Bowel Sounds] : normal bowel sounds [Abdomen Tenderness] : non-tender [Abdomen Soft] : soft [No Spinal Tenderness] : no spinal tenderness [Normal Gait] : normal gait [Normal Color / Pigmentation] : normal skin color and pigmentation [Normal Turgor] : normal skin turgor [No Focal Deficits] : no focal deficits [Normal Affect] : the affect was normal [Normal Mood] : the mood was normal

## 2025-07-21 NOTE — ASSESSMENT
[FreeTextEntry1] : Exetertional dyspnea, ground glass opacity of the lungs -, reviewed past ct scan of the chest, emphysematous changes in the left base. renewed fluticasone inhaler for patient. advised seeing pulmoloigist for breathing test.   skin cancer-  Hx BCC right cheek, remote.has spot right cheek - biopsied by doctor in [facility], did not get results, froze it, came back worse. saw [facility] dermatology, had shave biopsy, referred for microsurgery. dermatologist dr clemons information provided.   LE edema - improved significantly. follow up with dr wade.   hearing changes - audiology referral provided at patient request.    penile pain - resolved. follow up with urologist. has richard catheter, consider lidocaine if reocurrs.   htn - stable. no orthostatic vital signs appreciated today.   HM -Continue healthy meals and  physical activity as tolerated for weight maintenance. Vaccine information in chart. Orders and referral provided as below. Patient is here for episodic care. All patient questions answered today and understood by patient.  Henceforth, Patient to schedule in office follow up appointments including via Telehealth to discuss results and update plan of care, and if new symptoms, questions, renewals or health concerns. Patient has access to results and EMR in the patient portal for review.

## 2025-07-21 NOTE — HISTORY OF PRESENT ILLNESS
[Any fall with injury in past year] : Patient reported fall with injury in the past year [Completely Independent] : Completely independent. [0] : 2) Feeling down, depressed, or hopeless: Not at all (0) [PHQ-2 Negative - No further assessment needed] : PHQ-2 Negative - No further assessment needed [Designated Healthcare Proxy] : Designated healthcare proxy [Name: ___] : Health Care Proxy's Name: [unfilled]  [Relationship: ___] : Relationship: [unfilled] [I will adhere to the patient's wishes.] : I will adhere to the patient's wishes. [Time Spent: ___ minutes] : Time Spent: [unfilled] minutes [FreeTextEntry1] : Care team:  PCP Dr. Rosenberg Cardiology Dr. Rausch Uro Dr. WU  Mr. GIGI DONG is a 99  year old man with pmhx of BPH, insomnia, htn, a fib s/p cardioversion, anemia and fatigue, hx ot uti, LE edema, hx of fall with hematoma, 3cm renal mass, who presents for a follow up.   [facility] recommended follow up today. As per notes, He went to urgent care and they prescribed a 7 day course of cefpodoxime that did not resolve the pain.  He states on sunday his penile pain went away. He has been off antibiotics and has clear urine.   He complains of shortness of breath with exertion. He request renewal of fluticasone inhaler. [facility] Reviewed past ct scan of the lungs, ground glass opacities and and emphysematous changes of the left lower base appreciated.   He has weakness, orthostatics are fine.   Saw dermatologist. Hx BCC right cheek, remote.has spot right cheek - biopsied by doctor in [facility], did not get results, froze it, came back worse. saw [facility] dermatology, had shave biopsy, referred for microsurgery.  [[id number]] : 0 [FreeTextEntry4] : Pt is DNR/DNI.MOLST form in chart.

## 2025-07-30 NOTE — ASSESSMENT
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  I recommended Mohs surgery due to the tumor type, location, and ill-defined nature of cancer. I explained that following extirpation there will be a full thickness defect of the involved area. The reconstructive options will be based on the defect size and surrounding tissue laxity of the involved area. Primary closure is only possible for smaller defects. For larger defects, local tissue rearrangement or skin grafting may be necessary. Risks following layered primary closure or local tissue rearrangement include wound dehiscence, contour irregularity, bleeding, infection, and paresthesia (nerve damage including sensory deficit or motor weakness). Risks following skin grafting include wound dehiscence, skin graft nonadherence (partial or complete), contour irregularity, bleeding, infection, paresthesia, and donor site complications. I explained that the patient will need to abstain from physical activity for 1-2 weeks following the surgery, that they would heal with a scar, and also discussed the chances of infection, bleeding, potential sensory or motor nerve damage, and recurrence.  The patient indicated that s/he understood the risks and wished to schedule the procedure.   -- In particular, for reconstruction we discussed primary closure.   Thank you for this Mohs surgery referral. We recommended that Mr. GIGI DONG follow up with His referring dermatologist for routine skin exams following surgery.     Dianna Ramirez MD, [facility]   of Dermatology  Director of Dermatologic Surgery  [facility]

## 2025-07-30 NOTE — PHYSICAL EXAM
[FreeTextEntry3] : Right lower cheek with a yellow 1.2 x 1.7 cm pink plaque with yellow crust.  Lymph node exam in the neck: Negative

## 2025-07-30 NOTE — HISTORY OF PRESENT ILLNESS
[FreeTextEntry1] : Mohs surgery consultation for a well-differentiated on biopsy SCC of the right lower cheek [de-identified] : 07/30/2025  Referred by: Dr. Gilbert  Mr. 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  Present for several months, doesn't bleed or itch.  He is on Eliquis for paroxysmal afib.  History of CAD and HTN.  PMHx: remote hx kidney tumor that was removed. Now with new kidney tumor that has not been biopsied/w/o plan to treat.   Social History: Seen with his son. Lives alone in [place]. No upcoming travel plans. [place]. Executive in pharma prior to correction.   Blood thinners: Eliquis Allergies: None No history of HIV or hepatitis.  Medical implants: coronary a. stents.  Skin cancer: Hx BCC right cheek, remote, treated by outside dermatologist w/ Mohs over 10 years ago.  Former smoker over 60 years ago.

## 2025-07-30 NOTE — PHYSICAL EXAM
[FreeTextEntry3] : Right lower cheek with a yellow 1.2 x 1.7 cm pink plaque with yellow crust.  Lymph node exam in the neck: Negative  Initial Size Of Lesion: 1.7

## 2025-07-30 NOTE — HISTORY OF PRESENT ILLNESS
[FreeTextEntry1] : Mohs surgery consultation for a well-differentiated on biopsy SCC of the right lower cheek [de-identified] : 07/30/2025  Referred by: Dr. Gilbert  Mr. 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  Present for several months, doesn't bleed or itch.  He is on Eliquis for paroxysmal afib.  History of CAD and HTN.  PMHx: remote hx kidney tumor that was removed. Now with new kidney tumor that has not been biopsied/w/o plan to treat.   Social History: Seen with his son. Lives alone in [place]. No upcoming travel plans. [place]. 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   Blood thinners: Eliquis Allergies: None No history of HIV or hepatitis.  Medical implants: coronary a. stents.  Skin cancer: Hx BCC right cheek, remote, treated by outside dermatologist w/ Mohs over 10 years ago.  Former smoker over 60 years ago.

## 2025-07-30 NOTE — HISTORY OF PRESENT ILLNESS
[FreeTextEntry1] : Mohs surgery consultation for a well-differentiated on biopsy SCC of the right lower cheek [de-identified] : 07/30/2025  Referred by: Dr. Gilbert  Mr. 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  Present for several months, doesn't bleed or itch.  He is on Eliquis for paroxysmal afib.  History of CAD and HTN.  PMHx: remote hx kidney tumor that was removed. Now with new kidney tumor that has not been biopsied/w/o plan to treat.   Social History: Seen with his son. Lives alone in [place]. No upcoming travel plans. [place]. Executive in pharma prior to intermediate.   Blood thinners: Eliquis Allergies: None No history of HIV or hepatitis.  Medical implants: coronary a. stents.  Skin cancer: Hx BCC right cheek, remote, treated by outside dermatologist w/ Mohs over 10 years ago.  Former smoker over 60 years ago.

## 2025-07-30 NOTE — ASSESSMENT
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  I recommended Mohs surgery due to the tumor type, location, and ill-defined nature of cancer. I explained that following extirpation there will be a full thickness defect of the involved area. The reconstructive options will be based on the defect size and surrounding tissue laxity of the involved area. Primary closure is only possible for smaller defects. For larger defects, local tissue rearrangement or skin grafting may be necessary. Risks following layered primary closure or local tissue rearrangement include wound dehiscence, contour irregularity, bleeding, infection, and paresthesia (nerve damage including sensory deficit or motor weakness). Risks following skin grafting include wound dehiscence, skin graft nonadherence (partial or complete), contour irregularity, bleeding, infection, paresthesia, and donor site complications. I explained that the patient will need to abstain from physical activity for 1-2 weeks following the surgery, that they would heal with a scar, and also discussed the chances of infection, bleeding, potential sensory or motor nerve damage, and recurrence.  The patient indicated that s/he understood the risks and wished to schedule the procedure.   -- In particular, for reconstruction we discussed primary closure.   Thank you for this Mohs surgery referral. We recommended that Mr. GIGI DONG follow up with His referring dermatologist for routine skin exams following surgery.     Dianna Ramirez MD, [facility]   of Dermatology  Director of Dermatologic Surgery  [facility]

## 2025-07-30 NOTE — ASSESSMENT
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  I recommended Mohs surgery due to the tumor type, location, and ill-defined nature of cancer. I explained that following extirpation there will be a full thickness defect of the involved area. The reconstructive options will be based on the defect size and surrounding tissue laxity of the involved area. Primary closure is only possible for smaller defects. For larger defects, local tissue rearrangement or skin grafting may be necessary. Risks following layered primary closure or local tissue rearrangement include wound dehiscence, contour irregularity, bleeding, infection, and paresthesia (nerve damage including sensory deficit or motor weakness). Risks following skin grafting include wound dehiscence, skin graft nonadherence (partial or complete), contour irregularity, bleeding, infection, paresthesia, and donor site complications. I explained that the patient will need to abstain from physical activity for 1-2 weeks following the surgery, that they would heal with a scar, and also discussed the chances of infection, bleeding, potential sensory or motor nerve damage, and recurrence.  The patient indicated that s/he understood the risks and wished to schedule the procedure.   -- In particular, for reconstruction we discussed primary closure.   Thank you for this Mohs surgery referral. We recommended that Mr. GIGI DONG follow up with His referring dermatologist for routine skin exams following surgery.     Dianna Ramirez MD, [facility]   of Dermatology  Director of Dermatologic Surgery  [facility]